# Patient Record
Sex: FEMALE | Race: WHITE | URBAN - METROPOLITAN AREA
[De-identification: names, ages, dates, MRNs, and addresses within clinical notes are randomized per-mention and may not be internally consistent; named-entity substitution may affect disease eponyms.]

---

## 2022-12-28 ENCOUNTER — OFFICE VISIT (OUTPATIENT)
Dept: URBAN - METROPOLITAN AREA CLINIC 75 | Facility: CLINIC | Age: 37
End: 2022-12-28
Payer: COMMERCIAL

## 2022-12-28 DIAGNOSIS — H46.9 OPTIC NEURITIS: Primary | ICD-10-CM

## 2022-12-28 PROCEDURE — 92133 CPTRZD OPH DX IMG PST SGM ON: CPT | Performed by: OPHTHALMOLOGY

## 2022-12-28 PROCEDURE — 99204 OFFICE O/P NEW MOD 45 MIN: CPT | Performed by: OPHTHALMOLOGY

## 2022-12-28 PROCEDURE — 92134 CPTRZ OPH DX IMG PST SGM RTA: CPT | Performed by: OPHTHALMOLOGY

## 2022-12-28 ASSESSMENT — INTRAOCULAR PRESSURE
OS: 11
OD: 14

## 2022-12-28 NOTE — IMPRESSION/PLAN
Impression: Optic neuritis: H46.9. Bilateral. No vasculitis - choroid and retina clear MRI revealing neg lesions of brain or spine Red color little to no change between OU - Blurred Plan: Discussed in detail. Pt st OU become fatigues and fluorescent sensitive by the end of the day. Pt st tingling and loss of sensation only when sleeping and can be resolved with position adjustment. Pt st MS, NMO, and MOG DX were discussed at [facility]. Educated pt on signs and symptoms of MS or MOG and possibility of DX in the future. Continue medication as directed by Dr. Martine Raza and attending Dr. Laureen Mccullough. No new treatment recommended at this time.

## 2023-03-01 ENCOUNTER — OFFICE VISIT (OUTPATIENT)
Dept: URBAN - METROPOLITAN AREA CLINIC 75 | Facility: CLINIC | Age: 38
End: 2023-03-01
Payer: COMMERCIAL

## 2023-03-01 DIAGNOSIS — H46.9 OPTIC NEURITIS: Primary | ICD-10-CM

## 2023-03-01 DIAGNOSIS — H52.221 REGULAR ASTIGMATISM, RIGHT EYE: ICD-10-CM

## 2023-03-01 PROCEDURE — 99213 OFFICE O/P EST LOW 20 MIN: CPT | Performed by: OPHTHALMOLOGY

## 2023-03-01 PROCEDURE — 92083 EXTENDED VISUAL FIELD XM: CPT | Performed by: OPHTHALMOLOGY

## 2023-03-01 ASSESSMENT — INTRAOCULAR PRESSURE
OS: 10
OD: 10

## 2023-03-01 NOTE — IMPRESSION/PLAN
Impression: Optic neuritis: H46.9 Bilateral. Pt DX of MOG

VF ordered and performed - stable baseline Pt st non persistent numbness or tingling Plan: Discussed testing and findings in detail. Educated pt stable to drive at her discretion of comfort due to field findings. No treatment recommended at this time. 

Contact office with any changes or concerns,

## 2023-03-01 NOTE — IMPRESSION/PLAN
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  Plan: Recommended pt return for refractive evaluation with [facility]

## 2023-03-20 ENCOUNTER — OFFICE VISIT (OUTPATIENT)
Dept: URBAN - METROPOLITAN AREA CLINIC 75 | Facility: CLINIC | Age: 38
End: 2023-03-20
Payer: COMMERCIAL

## 2023-03-20 DIAGNOSIS — H52.223 REGULAR ASTIGMATISM, BILATERAL: Primary | ICD-10-CM

## 2023-03-20 PROCEDURE — 92004 COMPRE OPH EXAM NEW PT 1/>: CPT | Performed by: OPTOMETRIST

## 2023-03-20 PROCEDURE — 92310 CONTACT LENS FITTING OU: CPT | Performed by: OPTOMETRIST

## 2023-03-20 ASSESSMENT — KERATOMETRY
OS: 45.50
OD: 45.25

## 2023-03-20 ASSESSMENT — VISUAL ACUITY
OD: 20/20
OS: 20/20

## 2023-03-20 ASSESSMENT — INTRAOCULAR PRESSURE
OS: 11
OD: 11

## 2023-09-25 ENCOUNTER — OFFICE VISIT (OUTPATIENT)
Dept: URBAN - METROPOLITAN AREA CLINIC 75 | Facility: CLINIC | Age: 38
End: 2023-09-25
Payer: COMMERCIAL

## 2023-09-25 DIAGNOSIS — H46.9 UNSPECIFIED OPTIC NEURITIS: Primary | ICD-10-CM

## 2023-09-25 PROCEDURE — 99214 OFFICE O/P EST MOD 30 MIN: CPT | Performed by: OPTOMETRIST

## 2023-09-25 PROCEDURE — 92133 CPTRZD OPH DX IMG PST SGM ON: CPT | Performed by: OPTOMETRIST

## 2023-09-25 ASSESSMENT — INTRAOCULAR PRESSURE
OS: 13
OD: 15

## 2025-04-28 ENCOUNTER — OFFICE VISIT (OUTPATIENT)
Dept: URBAN - METROPOLITAN AREA CLINIC 71 | Facility: CLINIC | Age: 40
End: 2025-04-28
Payer: COMMERCIAL

## 2025-04-28 DIAGNOSIS — H52.223 REGULAR ASTIGMATISM, BILATERAL: Primary | ICD-10-CM

## 2025-04-28 PROCEDURE — 92012 INTRM OPH EXAM EST PATIENT: CPT

## 2025-04-28 ASSESSMENT — VISUAL ACUITY
OD: 20/20
OS: 20/20

## 2025-04-28 ASSESSMENT — INTRAOCULAR PRESSURE
OD: 15
OS: 10